# Patient Record
Sex: FEMALE | Race: WHITE | Employment: FULL TIME
[De-identification: names, ages, dates, MRNs, and addresses within clinical notes are randomized per-mention and may not be internally consistent; named-entity substitution may affect disease eponyms.]

---

## 2024-11-07 ENCOUNTER — OFFICE VISIT (OUTPATIENT)
Dept: INTERNAL MEDICINE CLINIC | Facility: CLINIC | Age: 29
End: 2024-11-07
Payer: COMMERCIAL

## 2024-11-07 VITALS
TEMPERATURE: 98.4 F | OXYGEN SATURATION: 100 % | HEART RATE: 109 BPM | WEIGHT: 149.13 LBS | HEIGHT: 68 IN | BODY MASS INDEX: 22.6 KG/M2 | DIASTOLIC BLOOD PRESSURE: 89 MMHG | SYSTOLIC BLOOD PRESSURE: 138 MMHG

## 2024-11-07 DIAGNOSIS — J06.9 UPPER RESPIRATORY TRACT INFECTION, UNSPECIFIED TYPE: ICD-10-CM

## 2024-11-07 DIAGNOSIS — J01.01 ACUTE RECURRENT MAXILLARY SINUSITIS: Primary | ICD-10-CM

## 2024-11-07 PROCEDURE — 99214 OFFICE O/P EST MOD 30 MIN: CPT | Performed by: NURSE PRACTITIONER

## 2024-11-07 RX ORDER — METHYLPREDNISOLONE 4 MG/1
TABLET ORAL
Qty: 1 KIT | Refills: 0 | Status: SHIPPED | OUTPATIENT
Start: 2024-11-07 | End: 2024-11-13

## 2024-11-07 RX ORDER — CEFDINIR 300 MG/1
300 CAPSULE ORAL 2 TIMES DAILY
COMMUNITY
Start: 2024-10-31

## 2024-11-07 RX ORDER — AZITHROMYCIN 250 MG/1
TABLET, FILM COATED ORAL
Qty: 6 TABLET | Refills: 0 | Status: SHIPPED | OUTPATIENT
Start: 2024-11-07 | End: 2024-11-17

## 2024-11-07 SDOH — ECONOMIC STABILITY: FOOD INSECURITY: WITHIN THE PAST 12 MONTHS, YOU WORRIED THAT YOUR FOOD WOULD RUN OUT BEFORE YOU GOT MONEY TO BUY MORE.: NEVER TRUE

## 2024-11-07 SDOH — ECONOMIC STABILITY: INCOME INSECURITY: HOW HARD IS IT FOR YOU TO PAY FOR THE VERY BASICS LIKE FOOD, HOUSING, MEDICAL CARE, AND HEATING?: NOT HARD AT ALL

## 2024-11-07 SDOH — ECONOMIC STABILITY: FOOD INSECURITY: WITHIN THE PAST 12 MONTHS, THE FOOD YOU BOUGHT JUST DIDN'T LAST AND YOU DIDN'T HAVE MONEY TO GET MORE.: NEVER TRUE

## 2024-11-07 ASSESSMENT — ENCOUNTER SYMPTOMS
SINUS PRESSURE: 1
TROUBLE SWALLOWING: 0
DIARRHEA: 0
SHORTNESS OF BREATH: 0
NAUSEA: 0
SINUS PAIN: 1
WHEEZING: 0
ABDOMINAL PAIN: 0
VOMITING: 0
COUGH: 1
SORE THROAT: 0

## 2024-11-07 NOTE — PROGRESS NOTES
sounds: Normal heart sounds.   Pulmonary:      Effort: Pulmonary effort is normal. No respiratory distress.      Breath sounds: Normal breath sounds. No wheezing, rhonchi or rales.   Abdominal:      General: Bowel sounds are normal.   Musculoskeletal:      Cervical back: No rigidity.   Skin:     General: Skin is warm and dry.   Neurological:      Mental Status: She is alert and oriented to person, place, and time.   Psychiatric:         Mood and Affect: Mood normal.         Speech: Speech normal.         Behavior: Behavior normal.                  Lab Results   Component Value Date    WBC 6.8 09/20/2023    HGB 14.5 09/20/2023    HCT 43.2 09/20/2023    MCV 94.3 09/20/2023     09/20/2023   ,   Lab Results   Component Value Date/Time     09/20/2023 03:18 PM    K 3.8 09/20/2023 03:18 PM     09/20/2023 03:18 PM    CO2 27 09/20/2023 03:18 PM    BUN 11 09/20/2023 03:18 PM    CREATININE 0.90 09/20/2023 03:18 PM    GLUCOSE 84 09/20/2023 03:18 PM    CALCIUM 10.1 09/20/2023 03:18 PM    LABGLOM >60 09/20/2023 03:18 PM    ,   Lab Results   Component Value Date    TSH 4.990 (H) 05/11/2021    TSHELE 1.45 09/20/2023   , @lastlipid3@,   Lab Results   Component Value Date    ALT 21 09/20/2023    AST 17 09/20/2023    ALKPHOS 42 (L) 09/20/2023    BILITOT 0.2 09/20/2023   ,   Hemoglobin A1C   Date Value Ref Range Status   09/20/2023 5.0 4.8 - 5.6 % Final   , No results found for: \"MALBCR\"        4/30/2024     3:20 PM   PHQ-9    Little interest or pleasure in doing things 0   Feeling down, depressed, or hopeless 0   PHQ-2 Score 0   PHQ-9 Total Score 0        Assessment/Plan:      Health Maintenance Due   Topic Date Due    Varicella vaccine (1 of 2 - 13+ 2-dose series) Never done    Hepatitis B vaccine (1 of 3 - 19+ 3-dose series) Never done    DTaP/Tdap/Td vaccine (1 - Tdap) Never done    COVID-19 Vaccine (1 - 2023-24 season) Never done          Arely was seen today for sinus problem.    Diagnoses and all orders for

## 2024-12-02 ENCOUNTER — TELEMEDICINE (OUTPATIENT)
Dept: INTERNAL MEDICINE CLINIC | Facility: CLINIC | Age: 29
End: 2024-12-02
Payer: COMMERCIAL

## 2024-12-02 DIAGNOSIS — G43.909 MIGRAINE SYNDROME: Primary | ICD-10-CM

## 2024-12-02 PROCEDURE — 99214 OFFICE O/P EST MOD 30 MIN: CPT | Performed by: NURSE PRACTITIONER

## 2024-12-02 ASSESSMENT — ENCOUNTER SYMPTOMS
COUGH: 0
PHOTOPHOBIA: 1
SHORTNESS OF BREATH: 0
VOMITING: 0
NAUSEA: 1

## 2025-01-21 ENCOUNTER — OFFICE VISIT (OUTPATIENT)
Dept: INTERNAL MEDICINE CLINIC | Facility: CLINIC | Age: 30
End: 2025-01-21
Payer: COMMERCIAL

## 2025-01-21 VITALS
DIASTOLIC BLOOD PRESSURE: 80 MMHG | HEIGHT: 66 IN | TEMPERATURE: 97.7 F | WEIGHT: 147.6 LBS | SYSTOLIC BLOOD PRESSURE: 134 MMHG | OXYGEN SATURATION: 99 % | BODY MASS INDEX: 23.72 KG/M2 | HEART RATE: 96 BPM | RESPIRATION RATE: 17 BRPM

## 2025-01-21 DIAGNOSIS — R53.82 CHRONIC FATIGUE: ICD-10-CM

## 2025-01-21 DIAGNOSIS — G43.909 MIGRAINE SYNDROME: ICD-10-CM

## 2025-01-21 DIAGNOSIS — Z00.00 ANNUAL PHYSICAL EXAM: Primary | ICD-10-CM

## 2025-01-21 DIAGNOSIS — Z13.220 LIPID SCREENING: ICD-10-CM

## 2025-01-21 DIAGNOSIS — Z13.1 DIABETES MELLITUS SCREENING: ICD-10-CM

## 2025-01-21 DIAGNOSIS — Z13.29 THYROID DISORDER SCREENING: ICD-10-CM

## 2025-01-21 LAB
ALBUMIN SERPL-MCNC: 4.4 G/DL (ref 3.5–5)
ALBUMIN/GLOB SERPL: 1.4 (ref 1–1.9)
ALP SERPL-CCNC: 43 U/L (ref 35–104)
ALT SERPL-CCNC: 18 U/L (ref 8–45)
ANION GAP SERPL CALC-SCNC: 13 MMOL/L (ref 7–16)
AST SERPL-CCNC: 22 U/L (ref 15–37)
BASOPHILS # BLD: 0.01 K/UL (ref 0–0.2)
BASOPHILS NFR BLD: 0.2 % (ref 0–2)
BILIRUB SERPL-MCNC: 0.3 MG/DL (ref 0–1.2)
BUN SERPL-MCNC: 14 MG/DL (ref 6–23)
CALCIUM SERPL-MCNC: 9.9 MG/DL (ref 8.8–10.2)
CHLORIDE SERPL-SCNC: 101 MMOL/L (ref 98–107)
CHOLEST SERPL-MCNC: 207 MG/DL (ref 0–200)
CO2 SERPL-SCNC: 25 MMOL/L (ref 20–29)
CREAT SERPL-MCNC: 0.78 MG/DL (ref 0.6–1.1)
DIFFERENTIAL METHOD BLD: ABNORMAL
EOSINOPHIL # BLD: 0.06 K/UL (ref 0–0.8)
EOSINOPHIL NFR BLD: 1.1 % (ref 0.5–7.8)
ERYTHROCYTE [DISTWIDTH] IN BLOOD BY AUTOMATED COUNT: 11.6 % (ref 11.9–14.6)
EST. AVERAGE GLUCOSE BLD GHB EST-MCNC: 99 MG/DL
GLOBULIN SER CALC-MCNC: 3.1 G/DL (ref 2.3–3.5)
GLUCOSE SERPL-MCNC: 85 MG/DL (ref 70–99)
HBA1C MFR BLD: 5.1 % (ref 0–5.6)
HCT VFR BLD AUTO: 45.2 % (ref 35.8–46.3)
HDLC SERPL-MCNC: 64 MG/DL (ref 40–60)
HDLC SERPL: 3.2 (ref 0–5)
HGB BLD-MCNC: 15.3 G/DL (ref 11.7–15.4)
IMM GRANULOCYTES # BLD AUTO: 0.01 K/UL (ref 0–0.5)
IMM GRANULOCYTES NFR BLD AUTO: 0.2 % (ref 0–5)
LDLC SERPL CALC-MCNC: 124 MG/DL (ref 0–100)
LYMPHOCYTES # BLD: 2.36 K/UL (ref 0.5–4.6)
LYMPHOCYTES NFR BLD: 43.1 % (ref 13–44)
MCH RBC QN AUTO: 31.9 PG (ref 26.1–32.9)
MCHC RBC AUTO-ENTMCNC: 33.8 G/DL (ref 31.4–35)
MCV RBC AUTO: 94.2 FL (ref 82–102)
MONOCYTES # BLD: 0.42 K/UL (ref 0.1–1.3)
MONOCYTES NFR BLD: 7.7 % (ref 4–12)
NEUTS SEG # BLD: 2.62 K/UL (ref 1.7–8.2)
NEUTS SEG NFR BLD: 47.7 % (ref 43–78)
NRBC # BLD: 0 K/UL (ref 0–0.2)
PLATELET # BLD AUTO: 277 K/UL (ref 150–450)
PMV BLD AUTO: 10.2 FL (ref 9.4–12.3)
POTASSIUM SERPL-SCNC: 4.3 MMOL/L (ref 3.5–5.1)
PROT SERPL-MCNC: 7.5 G/DL (ref 6.3–8.2)
RBC # BLD AUTO: 4.8 M/UL (ref 4.05–5.2)
SODIUM SERPL-SCNC: 140 MMOL/L (ref 136–145)
T3 SERPL-MCNC: 1.37 NG/ML (ref 0.6–1.81)
T4 FREE SERPL-MCNC: 1.3 NG/DL (ref 0.9–1.7)
TRIGL SERPL-MCNC: 94 MG/DL (ref 0–150)
TSH W FREE THYROID IF ABNORMAL: 1.34 UIU/ML (ref 0.27–4.2)
VLDLC SERPL CALC-MCNC: 19 MG/DL (ref 6–23)
WBC # BLD AUTO: 5.5 K/UL (ref 4.3–11.1)

## 2025-01-21 PROCEDURE — 99395 PREV VISIT EST AGE 18-39: CPT | Performed by: NURSE PRACTITIONER

## 2025-01-21 RX ORDER — ASCORBIC ACID 500 MG
500 TABLET ORAL DAILY
COMMUNITY

## 2025-01-21 RX ORDER — CHOLECALCIFEROL (VITAMIN D3) 1250 MCG
CAPSULE ORAL
COMMUNITY

## 2025-01-21 SDOH — ECONOMIC STABILITY: FOOD INSECURITY: WITHIN THE PAST 12 MONTHS, YOU WORRIED THAT YOUR FOOD WOULD RUN OUT BEFORE YOU GOT MONEY TO BUY MORE.: NEVER TRUE

## 2025-01-21 SDOH — ECONOMIC STABILITY: INCOME INSECURITY: IN THE LAST 12 MONTHS, WAS THERE A TIME WHEN YOU WERE NOT ABLE TO PAY THE MORTGAGE OR RENT ON TIME?: NO

## 2025-01-21 SDOH — ECONOMIC STABILITY: FOOD INSECURITY: WITHIN THE PAST 12 MONTHS, THE FOOD YOU BOUGHT JUST DIDN'T LAST AND YOU DIDN'T HAVE MONEY TO GET MORE.: NEVER TRUE

## 2025-01-21 ASSESSMENT — LIFESTYLE VARIABLES
HOW OFTEN DO YOU HAVE A DRINK CONTAINING ALCOHOL: MONTHLY OR LESS
HOW MANY STANDARD DRINKS CONTAINING ALCOHOL DO YOU HAVE ON A TYPICAL DAY: 1 OR 2

## 2025-01-21 ASSESSMENT — ENCOUNTER SYMPTOMS
ABDOMINAL PAIN: 0
NAUSEA: 0
SHORTNESS OF BREATH: 0
VOMITING: 0
DIARRHEA: 0
COUGH: 0

## 2025-01-21 ASSESSMENT — ANXIETY QUESTIONNAIRES
7. FEELING AFRAID AS IF SOMETHING AWFUL MIGHT HAPPEN: NOT AT ALL
GAD7 TOTAL SCORE: 0
4. TROUBLE RELAXING: NOT AT ALL
1. FEELING NERVOUS, ANXIOUS, OR ON EDGE: NOT AT ALL
6. BECOMING EASILY ANNOYED OR IRRITABLE: NOT AT ALL
2. NOT BEING ABLE TO STOP OR CONTROL WORRYING: NOT AT ALL
5. BEING SO RESTLESS THAT IT IS HARD TO SIT STILL: NOT AT ALL
3. WORRYING TOO MUCH ABOUT DIFFERENT THINGS: NOT AT ALL

## 2025-01-21 ASSESSMENT — PATIENT HEALTH QUESTIONNAIRE - PHQ9
SUM OF ALL RESPONSES TO PHQ QUESTIONS 1-9: 0
SUM OF ALL RESPONSES TO PHQ9 QUESTIONS 1 & 2: 0
1. LITTLE INTEREST OR PLEASURE IN DOING THINGS: NOT AT ALL
SUM OF ALL RESPONSES TO PHQ QUESTIONS 1-9: 0
SUM OF ALL RESPONSES TO PHQ QUESTIONS 1-9: 0
2. FEELING DOWN, DEPRESSED OR HOPELESS: NOT AT ALL
SUM OF ALL RESPONSES TO PHQ QUESTIONS 1-9: 0

## 2025-01-21 ASSESSMENT — SOCIAL DETERMINANTS OF HEALTH (SDOH)
HOW OFTEN DO YOU ATTEND CHURCH OR RELIGIOUS SERVICES?: NEVER
HOW OFTEN DO YOU ATTENT MEETINGS OF THE CLUB OR ORGANIZATION YOU BELONG TO?: NEVER
DO YOU BELONG TO ANY CLUBS OR ORGANIZATIONS SUCH AS CHURCH GROUPS UNIONS, FRATERNAL OR ATHLETIC GROUPS, OR SCHOOL GROUPS?: NO
HOW OFTEN DO YOU GET TOGETHER WITH FRIENDS OR RELATIVES?: ONCE A WEEK
IN A TYPICAL WEEK, HOW MANY TIMES DO YOU TALK ON THE PHONE WITH FAMILY, FRIENDS, OR NEIGHBORS?: MORE THAN THREE TIMES A WEEK

## 2025-01-21 NOTE — PROGRESS NOTES
screening  -     Lipid Panel; Future  -     Lipid Panel      Patient states she is otherwise doing well; has no further questions or concerns at this visit.  Encouraged to contact office with any concerns prior to next visit. Advise patient to notify office if they do not receive results of any labs or tests ordered within 2-3 days of the lab/test.     Return in about 1 year (around 1/21/2026), or if symptoms worsen or fail to improve, for Annual Physical, Follow up, Fasting labs.    Ericka Majano, APRN - CNP

## 2025-02-18 ENCOUNTER — TELEMEDICINE (OUTPATIENT)
Dept: INTERNAL MEDICINE CLINIC | Facility: CLINIC | Age: 30
End: 2025-02-18
Payer: COMMERCIAL

## 2025-02-18 DIAGNOSIS — G43.909 MIGRAINE SYNDROME: Primary | ICD-10-CM

## 2025-02-18 PROCEDURE — 99214 OFFICE O/P EST MOD 30 MIN: CPT | Performed by: NURSE PRACTITIONER

## 2025-02-18 ASSESSMENT — ENCOUNTER SYMPTOMS
PHOTOPHOBIA: 1
NAUSEA: 1

## 2025-02-18 NOTE — PROGRESS NOTES
[facility]  Office Visit Note    Subjective:  Chief Complaint   Patient presents with    Migraine       Patient ID: Arely Resendiz is a 29 y.o. female presenting to the office for the above.    Arely Resendiz, was evaluated through a synchronous (real-time) audio-video encounter. The patient (or guardian if applicable) is aware that this is a billable service, which includes applicable co-pays. This Virtual Visit was conducted with patient's (and/or legal guardian's) consent. Patient identification was verified, and a caregiver was present when appropriate.   The patient was located at [facility] [id number]  Provider was located at Facility (Appt Dept): [address] [place] [address]  Confirm you are appropriately licensed, registered, or certified to deliver care in the state where the patient is located as indicated above. If you are not or unsure, please re-schedule the visit: Yes, I confirm.           29 year old female to discuss migraines.     Migraines--  HPI May 2024:   She has had migraines for years, about twice a month. They have usually been relieved with OTC medications, but have been worsening over the past few weeks. She has noted no triggers for her migraines, but is getting  next week and has had some related increased stress that may be contributing.   She has an aura where she loses vision, usually on her right side, for about 15 minutes. The pain then hits after the vision returns, and she has photophobia, phonophobia, and nausea. Lasts most of the day.   She has never been on a prescription medication for her migraines.   --Discussed options, and will trial Imitrex; Discussed risks/benefits, alternatives, potential side effects, proper administration of medication.   --Advise to keep diary to look for triggers of her migraines.   --I did advise her to notify her gynecologist that she has migraines with aura; states she has never discussed this with

## 2025-03-06 DIAGNOSIS — Z30.41 ENCOUNTER FOR SURVEILLANCE OF CONTRACEPTIVE PILLS: ICD-10-CM

## 2025-03-06 RX ORDER — NORETHINDRONE ACETATE AND ETHINYL ESTRADIOL 1.5-30(21)
1 KIT ORAL DAILY
Qty: 3 PACKET | Refills: 0 | Status: SHIPPED | OUTPATIENT
Start: 2025-03-06

## 2025-03-06 NOTE — TELEPHONE ENCOUNTER
Called pt to reschedule her appointment due to MD being out of the office. Pt requests a refill of her birth control pills until appointment.

## 2025-03-18 DIAGNOSIS — Z30.41 ENCOUNTER FOR SURVEILLANCE OF CONTRACEPTIVE PILLS: ICD-10-CM

## 2025-03-18 RX ORDER — NORETHINDRONE ACETATE AND ETHINYL ESTRADIOL 1.5-30(21)
1 KIT ORAL DAILY
Qty: 3 PACKET | Refills: 0 | OUTPATIENT
Start: 2025-03-18

## 2025-04-21 NOTE — PROGRESS NOTES
HPI    Arely Resendiz is a 29 y.o. female seen for annual GYN exam.  She is going to [place] for her 1st wedding anniversary.  She may start trying for pregnancy    Past Medical History, Past Surgical History, Family history, Social History, and Medications were all reviewed with the patient today and updated as necessary.     Current Outpatient Medications   Medication Sig    norethindrone-ethinyl estradiol-iron (LOESTRIN FE 1.5/30) 1.5-30 MG-MCG tablet Take 1 tablet by mouth daily    Ubrogepant 50 MG TABS Take 50 mg by mouth as needed (migraine) May repeat dose in 2 hours if needed. Do not exceed two doses in 24 hours.    ELDERBERRY PO Take by mouth    vitamin C (ASCORBIC ACID) 500 MG tablet Take 1 tablet by mouth daily    Cholecalciferol (VITAMIN D3) 1.25 MG (99970 UT) CAPS Take by mouth    Multiple Vitamin (MULTIVITAMIN ADULT PO) Take by mouth    OIL OF OREGANO PO Take by mouth (Patient not taking: Reported on 4/23/2025)    rimegepant sulfate 75 MG TBDP Take 75 mg by mouth daily as needed (migraine) Do not exceed one tablet per 24 hours. (Patient not taking: Reported on 4/23/2025)    rizatriptan (MAXALT) 10 MG tablet Take 1 tablet by mouth once as needed for Migraine May repeat in 2 hours if needed (Patient not taking: Reported on 1/21/2025)     No current facility-administered medications for this visit.     Allergies   Allergen Reactions    Minocycline Rash    Doxycycline     Tetracycline      Past Medical History:   Diagnosis Date    Abnormal Pap smear of cervix     ASCUS with positive high risk HPV cervical      Past Surgical History:   Procedure Laterality Date    COLPOSCOPY  2018    WISDOM TOOTH EXTRACTION  2017     Family History   Problem Relation Age of Onset    No Known Problems Mother     No Known Problems Father     Hypothyroidism Maternal Grandmother     No Known Problems Maternal Grandfather     Heart Disease Paternal Grandmother     Hypertension Paternal Grandmother     Diabetes Paternal

## 2025-04-23 ENCOUNTER — OFFICE VISIT (OUTPATIENT)
Dept: OBGYN CLINIC | Age: 30
End: 2025-04-23
Payer: COMMERCIAL

## 2025-04-23 VITALS
HEIGHT: 67 IN | DIASTOLIC BLOOD PRESSURE: 78 MMHG | SYSTOLIC BLOOD PRESSURE: 102 MMHG | BODY MASS INDEX: 23.7 KG/M2 | WEIGHT: 151 LBS

## 2025-04-23 DIAGNOSIS — Z01.419 WELL WOMAN EXAM: Primary | ICD-10-CM

## 2025-04-23 DIAGNOSIS — Z30.41 ENCOUNTER FOR SURVEILLANCE OF CONTRACEPTIVE PILLS: ICD-10-CM

## 2025-04-23 DIAGNOSIS — Z12.4 SCREENING FOR MALIGNANT NEOPLASM OF CERVIX: ICD-10-CM

## 2025-04-23 PROCEDURE — 99459 PELVIC EXAMINATION: CPT | Performed by: OBSTETRICS & GYNECOLOGY

## 2025-04-23 PROCEDURE — 99395 PREV VISIT EST AGE 18-39: CPT | Performed by: OBSTETRICS & GYNECOLOGY

## 2025-04-23 RX ORDER — NORETHINDRONE ACETATE AND ETHINYL ESTRADIOL 1.5-30(21)
1 KIT ORAL DAILY
Qty: 3 PACKET | Refills: 3 | Status: SHIPPED | OUTPATIENT
Start: 2025-04-23

## 2025-05-02 LAB
COLLECTION METHOD: NORMAL
CYTOLOGIST CVX/VAG CYTO: NORMAL
CYTOLOGY CVX/VAG DOC THIN PREP: NORMAL
DATE OF LMP: NORMAL
HPV REFLEX: NORMAL
Lab: NORMAL
Lab: NORMAL
PAP SOURCE: NORMAL
PATH REPORT.FINAL DX SPEC: NORMAL
PREV TREATMENT: NORMAL
STAT OF ADQ CVX/VAG CYTO-IMP: NORMAL

## 2025-05-15 ENCOUNTER — TELEMEDICINE (OUTPATIENT)
Dept: INTERNAL MEDICINE CLINIC | Facility: CLINIC | Age: 30
End: 2025-05-15
Payer: COMMERCIAL

## 2025-05-15 DIAGNOSIS — J01.00 ACUTE NON-RECURRENT MAXILLARY SINUSITIS: Primary | ICD-10-CM

## 2025-05-15 PROCEDURE — 99214 OFFICE O/P EST MOD 30 MIN: CPT | Performed by: NURSE PRACTITIONER

## 2025-05-15 RX ORDER — FLUCONAZOLE 150 MG/1
150 TABLET ORAL
Qty: 2 TABLET | Refills: 0 | Status: SHIPPED | OUTPATIENT
Start: 2025-05-15 | End: 2025-05-21

## 2025-05-15 ASSESSMENT — ENCOUNTER SYMPTOMS
SHORTNESS OF BREATH: 0
SINUS PAIN: 1
COUGH: 1
SORE THROAT: 1
ABDOMINAL PAIN: 0
RHINORRHEA: 1
VOMITING: 0
NAUSEA: 0
TROUBLE SWALLOWING: 0
SINUS PRESSURE: 1
DIARRHEA: 0

## 2025-05-15 NOTE — PROGRESS NOTES
[facility]  Office Visit Note    Subjective:  Chief Complaint   Patient presents with    Cough    Congestion       Patient ID: Arely Resendiz is a 29 y.o. female presenting to the office for the above.    Arely Resendiz, was evaluated through a synchronous (real-time) audio-video encounter. The patient (or guardian if applicable) is aware that this is a billable service, which includes applicable co-pays. This Virtual Visit was conducted with patient's (and/or legal guardian's) consent. Patient identification was verified, and a caregiver was present when appropriate.   The patient was located at Home: [address] [place] [id number]  Provider was located at Facility (Appt Dept): [address] [place] [address]  Confirm you are appropriately licensed, registered, or certified to deliver care in the state where the patient is located as indicated above. If you are not or unsure, please re-schedule the visit: Yes, I confirm.         29 year old female for sick visit.   Symptoms started 2 weeks ago here in [place], felt like allergies.  Then she traveled to [place], and her symptoms began to worsen.   Has had headache, sinus congestion (green mucus), sore throat, cough (states from postnasal drip).   Denies fevers/chills, chest pain, shortness of breath, GI symptoms.   --Treat with Augmentin.  Will send Diflucan in case of yeast infection with antibiotics.  Discussed risks/benefits, alternatives, potential side effects, proper administration of medications.   --Discussed symptomatic care (rest, fluids, nasal saline rinses, warm saltwater gargles, Robitussin/Mucinex prn, Tylenol prn, etc.).   --Advised of symptoms that would require reevaluation, or that would require immediate medical attention in the ER; patient expresses understanding.                   Previous history for reference:      .  Graduated from [employer] with a major in biology.  Works as a realtor.  Has 5yo

## 2025-07-03 ENCOUNTER — OFFICE VISIT (OUTPATIENT)
Dept: NEUROLOGY | Age: 30
End: 2025-07-03
Payer: COMMERCIAL

## 2025-07-03 VITALS
WEIGHT: 155 LBS | OXYGEN SATURATION: 98 % | SYSTOLIC BLOOD PRESSURE: 133 MMHG | BODY MASS INDEX: 24.46 KG/M2 | HEART RATE: 97 BPM | DIASTOLIC BLOOD PRESSURE: 82 MMHG

## 2025-07-03 DIAGNOSIS — G43.109 MIGRAINE WITH AURA AND WITHOUT STATUS MIGRAINOSUS, NOT INTRACTABLE: Primary | ICD-10-CM

## 2025-07-03 PROCEDURE — 99204 OFFICE O/P NEW MOD 45 MIN: CPT | Performed by: NURSE PRACTITIONER

## 2025-07-03 NOTE — PROGRESS NOTES
Arely Resendiz is a 29 y.o. female who presents with headaches.    CC: Headache      Referred by   , STUART Marshall*    History of Present Illness      The patient presents for evaluation of migraines.    Migraines  Migraines since 2020, worsened over the past year.  - Initially managed with ibuprofen and rest, now experiencing multiple migraines in succession or on consecutive days.  - Often preceded by aura with visual disturbances, slurred speech, and weakness.  - Pain is moderate to severe, akin to wearing a tight hat, accompanied by nausea but no vomiting.  - Generalized pain, initially localized to the right side before spreading.  - Frequency varies, with some months symptom-free, others up to three episodes.  - Off birth control for two months, menstrual cycle regular.  - Tried rizatriptan, sumatriptan, and Nurtec without success.  - Has not tried Ubrelvy.  - Previously used magnesium oxide to aid sleep, unsure of effect on headaches.  - Tried Excedrin, aspirin, and Tylenol, initially effective but now ineffective.  - Primary care physician suggested birth control could contribute to migraines, but frequency did not change with different types.  - No specific triggers identified.  - Reports good quality sleep, no imaging studies for headaches.  - Recalls one instance of left arm numbness during a migraine, not recurred.  - No neck pain, chiropractic treatment helpful in reducing frequency.  - Active lifestyle, running and lifting weights three to four times a week.  - Mood improved since discontinuing birth control.  - Onset: Since 2020, worsened over the past year.  - Location: Initially localized to the right side before spreading.  - Duration: Frequency varies, with some months symptom-free, others up to three episodes.  - Character: Moderate to severe pain, akin to wearing a tight hat, accompanied by nausea but no vomiting.  - Alleviating/Aggravating Factors: Initially managed with